# Patient Record
Sex: FEMALE | Race: WHITE | NOT HISPANIC OR LATINO | URBAN - METROPOLITAN AREA
[De-identification: names, ages, dates, MRNs, and addresses within clinical notes are randomized per-mention and may not be internally consistent; named-entity substitution may affect disease eponyms.]

---

## 2019-03-11 ENCOUNTER — TELEPHONE (OUTPATIENT)
Dept: OBSTETRICS AND GYNECOLOGY | Age: 39
End: 2019-03-11

## 2019-03-11 NOTE — TELEPHONE ENCOUNTER
NEW PATIENT: was a pt of Dr Dominguez moved in 2011, now is back wanting to re-estab gyn care, has [employer]. Pt aware Dr Dominguez will not see til Wednesday.

## 2019-05-22 ENCOUNTER — OFFICE VISIT (OUTPATIENT)
Dept: OBSTETRICS AND GYNECOLOGY | Age: 39
End: 2019-05-22

## 2019-05-22 VITALS
DIASTOLIC BLOOD PRESSURE: 72 MMHG | HEIGHT: 69 IN | WEIGHT: 185.2 LBS | SYSTOLIC BLOOD PRESSURE: 122 MMHG | BODY MASS INDEX: 27.43 KG/M2

## 2019-05-22 DIAGNOSIS — Z01.419 WELL WOMAN EXAM WITH ROUTINE GYNECOLOGICAL EXAM: Primary | ICD-10-CM

## 2019-05-22 DIAGNOSIS — Z12.4 SCREENING FOR CERVICAL CANCER: ICD-10-CM

## 2019-05-22 DIAGNOSIS — Z11.51 ENCOUNTER FOR SCREENING FOR HUMAN PAPILLOMAVIRUS (HPV): ICD-10-CM

## 2019-05-22 DIAGNOSIS — Z30.011 ENCOUNTER FOR INITIAL PRESCRIPTION OF CONTRACEPTIVE PILLS: ICD-10-CM

## 2019-05-22 DIAGNOSIS — K42.9 UMBILICAL HERNIA WITHOUT OBSTRUCTION AND WITHOUT GANGRENE: ICD-10-CM

## 2019-05-22 PROCEDURE — 99385 PREV VISIT NEW AGE 18-39: CPT | Performed by: OBSTETRICS & GYNECOLOGY

## 2019-05-22 RX ORDER — NORETHINDRONE ACETATE AND ETHINYL ESTRADIOL AND FERROUS FUMARATE 1MG-20(24)
1 KIT ORAL DAILY
Qty: 28 TABLET | Refills: 12 | Status: SHIPPED | OUTPATIENT
Start: 2019-05-22 | End: 2020-05-21

## 2019-05-22 RX ORDER — LEVOTHYROXINE SODIUM 0.15 MG/1
150 TABLET ORAL DAILY
Refills: 1 | COMMUNITY
Start: 2019-05-09 | End: 2020-06-17

## 2019-05-22 RX ORDER — CHOLECALCIFEROL (VITAMIN D3) 1250 MCG
CAPSULE ORAL
Refills: 1 | COMMUNITY
Start: 2019-04-14

## 2019-05-22 NOTE — PROGRESS NOTES
Chief complaint:    Subjective   History of Present Illness    Misty Juarez is a 39 y.o.  who presents for annual exam/ re-establish care. Delivered first 2 children with our group then moved to [place] several years ago.  Her menses are regular every 28-30 days, lasting 4-7 days, menorrhagia for first 2 days, no dysmenorrhea. Uses condoms for contraception. Wants to restart ocps.   Soc Hx- , pt and  are both engineers, she works for [employer] ([employer])    Obstetric History:  OB History      Para Term  AB Living    6 4 4   2 4    SAB TAB Ectopic Molar Multiple Live Births    2         4         Menstrual History:     Patient's last menstrual period was 2019 (exact date).         Current contraception: condoms  History of abnormal Pap smear: no  Received Gardasil immunization: no  Perform regular self breast exam: yes -    Family history of uterine or ovarian cancer: no  Family History of colon cancer: no  Family history of breast cancer: no    Mammogram: not indicated.  Colonoscopy: not indicated.  DEXA: not indicated.    Exercise: moderately active  Calcium/Vitamin D: adequate intake    The following portions of the patient's history were reviewed and updated as appropriate: allergies, current medications, past family history, past medical history, past social history, past surgical history and problem list.    Review of Systems   Constitutional: Negative for activity change, fatigue, fever and unexpected weight change.   Respiratory: Negative for chest tightness and shortness of breath.    Cardiovascular: Negative for chest pain, palpitations and leg swelling.   Gastrointestinal: Negative for abdominal distention, abdominal pain, blood in stool, constipation, diarrhea, nausea and vomiting.   Endocrine: Negative for cold intolerance, heat intolerance, polydipsia, polyphagia and polyuria.        Breast tenderness before menses   Genitourinary: Positive for menstrual  "problem.        Menorrhagia  Mid cycle pelvic pain   Musculoskeletal: Negative for arthralgias and back pain.   Skin: Negative for color change.   Neurological: Negative for weakness and headaches.   Hematological: Does not bruise/bleed easily.   Psychiatric/Behavioral: Negative for confusion.       Pertinent items are noted in HPI.     Objective   Physical Exam    /72   Ht 175.3 cm (69\")   Wt 84 kg (185 lb 3.2 oz)   LMP 05/08/2019 (Exact Date)   Breastfeeding? No   BMI 27.35 kg/m²     General:   alert, appears stated age and cooperative   Neck: no asymmetry, masses, or scars   Heart: regular rate and rhythm, S1, S2 normal, no murmur, click, rub or gallop   Lungs: clear to auscultation bilaterally   Abdomen: soft, non-tender, without masses or organomegaly, umbilical hernia noted   Breast: inspection negative, no nipple discharge or bleeding, no masses or nodularity palpable   Vulva: normal, Bartholin's, Urethra, [place]'s normal   Vagina: normal mucosa   Cervix: multiparous appearance, no bleeding following Pap and no cervical motion tenderness   Uterus: normal size, anteverted, mobile, non-tender, normal shape and consistency   Adnexa: normal adnexa and no mass, fullness, tenderness   Rectal: not indicated     Assessment/Plan   Misty was seen today for gynecologic exam.    Diagnoses and all orders for this visit:    Well woman exam with routine gynecological exam  -     PapIG, HPV, Rfx 16 / 18    Encounter for screening for human papillomavirus (HPV)    Encounter for initial prescription of contraceptive pills  -     PapIG, HPV, Rfx 16 / 18  -     norethindrone-ethinyl estradiol-ferrous fumarate (LOESTIN 24 FE) 1-20 MG-MCG(24) per tablet; Take 1 tablet by mouth Daily.    Screening for cervical cancer    Umbilical hernia without obstruction and without gangrene  -     Ambulatory Referral to General Surgery    discussed options for contraception, desires ocps, risks reviewed (DVT,PE,stroke) pt declines IUD " and Nexplanon at this time.  Recommend mammogram age 41.    Breast self exam technique reviewed and patient encouraged to perform self-exam monthly.  Discussed healthy lifestyle modifications.  Pap smear sent

## 2019-05-24 LAB
CYTOLOGIST CVX/VAG CYTO: NORMAL
CYTOLOGY CVX/VAG DOC CYTO: NORMAL
CYTOLOGY CVX/VAG DOC THIN PREP: NORMAL
DX ICD CODE: NORMAL
HIV 1 & 2 AB SER-IMP: NORMAL
HPV I/H RISK 1 DNA CVX QL PROBE+SIG AMP: NEGATIVE
OTHER STN SPEC: NORMAL
STAT OF ADQ CVX/VAG CYTO-IMP: NORMAL

## 2019-05-28 ENCOUNTER — TELEPHONE (OUTPATIENT)
Dept: OBSTETRICS AND GYNECOLOGY | Age: 39
End: 2019-05-28

## 2019-06-25 ENCOUNTER — OFFICE VISIT (OUTPATIENT)
Dept: SURGERY | Facility: CLINIC | Age: 39
End: 2019-06-25

## 2019-06-25 VITALS
DIASTOLIC BLOOD PRESSURE: 80 MMHG | SYSTOLIC BLOOD PRESSURE: 120 MMHG | BODY MASS INDEX: 28.2 KG/M2 | OXYGEN SATURATION: 99 % | HEART RATE: 85 BPM | WEIGHT: 190.4 LBS | HEIGHT: 69 IN

## 2019-06-25 DIAGNOSIS — K42.9 UMBILICAL HERNIA WITHOUT OBSTRUCTION AND WITHOUT GANGRENE: Primary | ICD-10-CM

## 2019-06-25 PROCEDURE — 99203 OFFICE O/P NEW LOW 30 MIN: CPT | Performed by: SURGERY

## 2019-06-25 NOTE — PROGRESS NOTES
Chief Complaint   Patient presents with   • Hernia     umb        Patient is a 39 y.o. female referred by Schuyler Norton MD for evaluation of periumbilical pain.  Patient reports she has a bulge in her umbilicus that is increased in size over the last 2 to 3 years.  Patient reports when she stands for long periods of time it aches or is active.  Patient denies nausea, vomiting, fever or chills.  Patient has not had any previous abdominal surgeries.  Patient reports that we will go in and be flat when she is lying down.    Past Medical History:   Diagnosis Date   • Gestational hypertension    • Umbilical hernia      Past Surgical History:   Procedure Laterality Date   • DILATATION AND CURETTAGE       Family History   Problem Relation Age of Onset   • Breast cancer Neg Hx    • Ovarian cancer Neg Hx    • Uterine cancer Neg Hx    • Colon cancer Neg Hx    • Deep vein thrombosis Neg Hx    • Pulmonary embolism Neg Hx      Social History     Tobacco Use   • Smoking status: Never Smoker   • Smokeless tobacco: Never Used   Substance Use Topics   • Alcohol use: Yes     Comment: rare use   • Drug use: No     No Known Allergies    Current Outpatient Medications:   •  Cholecalciferol (VITAMIN D3) 59833 units capsule, TK 1 C PO WEEKLY, Disp: , Rfl: 1  •  levothyroxine (SYNTHROID, LEVOTHROID) 150 MCG tablet, Take 150 mcg by mouth Daily., Disp: , Rfl: 1  •  norethindrone-ethinyl estradiol-ferrous fumarate (LOESTIN 24 FE) 1-20 MG-MCG(24) per tablet, Take 1 tablet by mouth Daily., Disp: 28 tablet, Rfl: 12    Review of Systems   Gastrointestinal: Positive for abdominal distention.   Allergic/Immunologic: Positive for environmental allergies.   All other systems reviewed and are negative.      There were no vitals filed for this visit.    Physical Exam  General/physcological:   Alert and oriented x3, in no acute distress  HEENT: Normal cephalic, atraumatic, PERRLA, EOMI, sclera anicteric, moist mucous membranes, neck is supple, no  JVD, no carotid bruits, no thyromegaly no adenopathy  Respiratory: CTA and percussion  CVA: RRR, normal S1-S2, no murmurs, no gallops or rubs  GI: Positive BS, soft, nondistended, nontender, no rebound, no guarding, reducible umbilical hernia, no organomegaly and no masses  Musculoskeletal: Full range of motion, no clubbing, no cyanosis or edema  Neurovascular: Grossly intact  Debilities: none  Emotional behavior: appropriate     Patient does not use tobacco products currently.     Assessment:  Reducible umbilical hernia symptomatic  Plan:  I have recommended that the patient undergo an open umbilical hernia repair with mesh.  I have discussed this procedure in detail with the patient and given her patient teaching pamphlet.  I have discussed the risks, benefits and alternatives.  I have discussed the risk of anesthesia, bleeding, infection, DVTs, bowel injuries and recurrence.  Patient understands these risks, benefits and alternatives and wishes to proceed.  I have her scheduled at her earliest convenience.    Kelle Bruce MD  General, Minimally Invasive and Endoscopic Surgery  [facility] [place] [id number] [facility] [address] [place] [address]   [address]    [address]  [place], [place] [id number] [place] [address]    P: [phone]  F: [phone]    Cc:  Schuyler Norton MD

## 2019-07-26 ENCOUNTER — OUTSIDE FACILITY SERVICE (OUTPATIENT)
Dept: SURGERY | Facility: CLINIC | Age: 39
End: 2019-07-26

## 2019-07-26 PROCEDURE — 49587 PR REPAIR UMBILICAL HERN,5+Y/O,STRANG: CPT | Performed by: SURGERY

## 2019-08-06 ENCOUNTER — OFFICE VISIT (OUTPATIENT)
Dept: SURGERY | Facility: CLINIC | Age: 39
End: 2019-08-06

## 2019-08-06 VITALS
OXYGEN SATURATION: 98 % | HEART RATE: 119 BPM | DIASTOLIC BLOOD PRESSURE: 84 MMHG | HEIGHT: 69 IN | WEIGHT: 193.6 LBS | SYSTOLIC BLOOD PRESSURE: 120 MMHG | BODY MASS INDEX: 28.68 KG/M2

## 2019-08-06 DIAGNOSIS — Z09 FOLLOW UP: Primary | ICD-10-CM

## 2019-08-06 PROCEDURE — 99024 POSTOP FOLLOW-UP VISIT: CPT | Performed by: SURGERY

## 2019-08-06 NOTE — PROGRESS NOTES
"Chief complaint:  Post-op  Follow up    History of Present Illness    This is Misty Juarez 39 y.o. status post open umbilical hernia repair with mesh and is doing very well.  Patient denies fever, chills, nausea or vomiting.  Patient's pain is well-controlled.      The following portions of the patient's history were reviewed and updated as appropriate: allergies, current medications, past family history, past medical history, past social history, past surgical history and problem list.    Vitals:    08/06/19 0910   BP: 120/84   Pulse: 119   SpO2: 98%   Weight: 87.8 kg (193 lb 9.6 oz)   Height: 175.3 cm (69\")       Physical Exam  Gen.: Patient is alert and oriented ×3, no acute distress  HEENT: Normal cephalic, atraumatic, moist mucous membranes, anicteric  Incision is well-healed without evidence of infection, herniation or seroma.    Assessment/plan:    This is Misty Juarez 39 y.o. status post open umbilical hernia repair with mesh and is doing very well.  I have instructed the patient not lift greater than 10 pounds for total of 6 weeks from the time of surgery. I have instructed the patient follow-up as needed.    Kelle Bruce MD  General, Minimally Invasive and Endoscopic Surgery  [facility]    [address] [facility] [address] [place] [address]    [address]  [place], [place] [id number] [place] [address]    P: [phone]  F: [phone]    Cc:  Schuyler Norton MD  "

## 2020-06-17 ENCOUNTER — OFFICE VISIT (OUTPATIENT)
Dept: OBSTETRICS AND GYNECOLOGY | Age: 40
End: 2020-06-17

## 2020-06-17 VITALS
SYSTOLIC BLOOD PRESSURE: 110 MMHG | HEIGHT: 69 IN | BODY MASS INDEX: 30.51 KG/M2 | WEIGHT: 206 LBS | DIASTOLIC BLOOD PRESSURE: 72 MMHG

## 2020-06-17 DIAGNOSIS — Z01.419 WELL WOMAN EXAM WITH ROUTINE GYNECOLOGICAL EXAM: Primary | ICD-10-CM

## 2020-06-17 DIAGNOSIS — Z30.41 ENCOUNTER FOR SURVEILLANCE OF CONTRACEPTIVE PILLS: ICD-10-CM

## 2020-06-17 PROCEDURE — 99395 PREV VISIT EST AGE 18-39: CPT | Performed by: OBSTETRICS & GYNECOLOGY

## 2020-06-17 RX ORDER — LEVOTHYROXINE SODIUM 137 UG/1
137 TABLET ORAL DAILY
COMMUNITY
Start: 2020-05-19

## 2020-06-17 RX ORDER — ACETAMINOPHEN AND CODEINE PHOSPHATE 120; 12 MG/5ML; MG/5ML
1 SOLUTION ORAL DAILY
Qty: 84 TABLET | Refills: 3 | Status: SHIPPED | OUTPATIENT
Start: 2020-06-17 | End: 2021-06-17

## 2020-06-17 RX ORDER — LISINOPRIL 10 MG/1
5 TABLET ORAL DAILY
COMMUNITY
Start: 2020-05-29

## 2020-06-17 RX ORDER — CYCLOBENZAPRINE HCL 10 MG
TABLET ORAL
COMMUNITY
Start: 2020-03-23

## 2020-06-17 NOTE — PROGRESS NOTES
"Chief complaint: annual    Subjective   History of Present Illness    Misty Juarez is a 40 y.o.  who presents for annual exam. Doing well. Recently started lisinopril for HTN. Was taking Loestrin 24 for contraception but ran out 3 mo ago. Desires to restart ocps. Declines IUD or Nexplanon (anything inserted). Currently using condoms  Her menses are regular every 28-30 days, lasting 3-4 days.   2019 pap normal, HPV-  Had umbilical hernia repair     Obstetric History:  OB History        6    Para   4    Term   4            AB   2    Living   4       SAB   2    TAB        Ectopic        Molar        Multiple        Live Births   4               Menstrual History:     Patient's last menstrual period was 2020.         Current contraception: condoms  History of abnormal Pap smear: no  Received Gardasil immunization: no  Perform regular self breast exam: mp  Family history of uterine or ovarian cancer: no  Family History of colon cancer:  no  Family history of breast cancer: no    Mammogram: ordered.  Colonoscopy: not indicated.  DEXA: not indicated.    Exercise: moderately active  Calcium/Vitamin D: adequate intake    The following portions of the patient's history were reviewed and updated as appropriate: allergies, current medications, past family history, past medical history, past social history, past surgical history and problem list.    Review of Systems    Pertinent items are noted in HPI.     Objective   Physical Exam    /72   Ht 175.3 cm (69\")   Wt 93.4 kg (206 lb)   LMP 2020   Breastfeeding No   BMI 30.42 kg/m²     General:   alert, appears stated age and cooperative   Neck: no asymmetry, masses, or scars   Heart: regular rate and rhythm, S1, S2 normal, no murmur, click, rub or gallop   Lungs: clear to auscultation bilaterally   Abdomen: soft, non-tender, without masses or organomegaly   Breast: inspection negative, no nipple discharge or bleeding, no masses or " nodularity palpable   Vulva: Bartholin's, Urethra, [place]'s normal   Vagina: normal mucosa   Cervix: multiparous appearance, no cervical motion tenderness and no lesions   Uterus: normal size, mobile, non-tender, normal shape and consistency   Adnexa: normal adnexa and no mass, fullness, tenderness   Rectal: not indicated     Assessment/Plan   Misty was seen today for gynecologic exam.    Diagnoses and all orders for this visit:    Well woman exam with routine gynecological exam    Encounter for surveillance of contraceptive pills  -     norethindrone (MICRONOR) 0.35 MG tablet; Take 1 tablet by mouth Daily.    pt desires to restart ocps. Will change to progesterone only pills due to chronic HTN requiring meds  May need to use condoms also as micronor is slightly less effective for contraception.    Breast self exam technique reviewed and patient encouraged to perform self-exam monthly.  Discussed healthy lifestyle modifications.  Pap smear up to date    Will start yearly mmg next year

## 2021-06-29 ENCOUNTER — PROCEDURE VISIT (OUTPATIENT)
Dept: OBSTETRICS AND GYNECOLOGY | Age: 41
End: 2021-06-29

## 2021-06-29 ENCOUNTER — APPOINTMENT (OUTPATIENT)
Dept: WOMENS IMAGING | Facility: HOSPITAL | Age: 41
End: 2021-06-29

## 2021-06-29 ENCOUNTER — OFFICE VISIT (OUTPATIENT)
Dept: OBSTETRICS AND GYNECOLOGY | Age: 41
End: 2021-06-29

## 2021-06-29 VITALS
DIASTOLIC BLOOD PRESSURE: 68 MMHG | SYSTOLIC BLOOD PRESSURE: 116 MMHG | WEIGHT: 189.6 LBS | HEIGHT: 69 IN | BODY MASS INDEX: 28.08 KG/M2

## 2021-06-29 DIAGNOSIS — Z12.31 VISIT FOR SCREENING MAMMOGRAM: Primary | ICD-10-CM

## 2021-06-29 DIAGNOSIS — Z01.419 WELL WOMAN EXAM WITH ROUTINE GYNECOLOGICAL EXAM: Primary | ICD-10-CM

## 2021-06-29 PROCEDURE — 77063 BREAST TOMOSYNTHESIS BI: CPT | Performed by: RADIOLOGY

## 2021-06-29 PROCEDURE — 77067 SCR MAMMO BI INCL CAD: CPT | Performed by: RADIOLOGY

## 2021-06-29 PROCEDURE — 77063 BREAST TOMOSYNTHESIS BI: CPT | Performed by: OBSTETRICS & GYNECOLOGY

## 2021-06-29 PROCEDURE — 77067 SCR MAMMO BI INCL CAD: CPT | Performed by: OBSTETRICS & GYNECOLOGY

## 2021-06-29 PROCEDURE — 99396 PREV VISIT EST AGE 40-64: CPT | Performed by: OBSTETRICS & GYNECOLOGY

## 2021-06-29 NOTE — PROGRESS NOTES
"Chief complaint: annual    Subjective   History of Present Illness    Misty Juarez is a 41 y.o.  who presents for annual exam. No gyn c/o. Uses condoms for contraception. micronor prescribed last year for contraception but she never started it. Had to dc combination ocps due to chronic HTN requiring antihypertensives. She declines IUD or progesterone only pills.  Her menses are q mo, last 4 days, normal flow  MMG today  2019 pap normal, HPV-  Soc hx- , works as an     Obstetric History:  OB History        6    Para   4    Term   4            AB   2    Living   4       SAB   2    TAB        Ectopic        Molar        Multiple        Live Births   4               Menstrual History:     Patient's last menstrual period was 2021 (exact date).         Current contraception: condoms  History of abnormal Pap smear: no  Received Gardasil immunization: no  Perform regular self breast exam: yes -    Family history of uterine or ovarian cancer: no  Family History of colon cancer: no  Family history of breast cancer: no    Mammogram: done today.  Colonoscopy: not indicated.  DEXA: not indicated.    Exercise: moderately active  Calcium/Vitamin D: adequate intake    The following portions of the patient's history were reviewed and updated as appropriate: allergies, current medications, past family history, past medical history, past social history, past surgical history and problem list.    Review of Systems   Constitutional: Negative.    Genitourinary: Negative.        Pertinent items are noted in HPI.     Objective   Physical Exam    /68   Ht 175.3 cm (69\")   Wt 86 kg (189 lb 9.6 oz)   LMP 2021 (Exact Date)   Breastfeeding No   BMI 28.00 kg/m²     General:   alert, appears stated age and cooperative   Neck: no asymmetry, masses, or scars   Heart: regular rate and rhythm, S1, S2 normal, no murmur, click, rub or gallop   Lungs: clear to auscultation bilaterally "   Abdomen: soft, non-tender, without masses or organomegaly   Breast: inspection negative, no nipple discharge or bleeding, no masses or nodularity palpable   Vulva: normal, Bartholin's, Urethra, [place]'s normal   Vagina: normal mucosa   Cervix: multiparous appearance, no cervical motion tenderness and no lesions   Uterus: normal size, mobile, non-tender, normal shape and consistency   Adnexa: normal adnexa and no mass, fullness, tenderness   Rectal: not indicated and external hemorrhoids noted       Assessment/Plan   Diagnoses and all orders for this visit:    1. Well woman exam with routine gynecological exam (Primary)    pap due next year, plan q 3 yrs  Pt states hemorrhoids don't bother her, declines referral for consult    Breast self exam technique reviewed and patient encouraged to perform self-exam monthly.  Discussed healthy lifestyle modifications.  Pap smear up to date    She will see Dr Bloom next year for annual exam and gyn care  Notified that I am leaving practice

## 2021-11-04 ENCOUNTER — TRANSCRIBE ORDERS (OUTPATIENT)
Dept: ADMINISTRATIVE | Facility: HOSPITAL | Age: 41
End: 2021-11-04

## 2021-11-04 DIAGNOSIS — U07.1 CLINICAL DIAGNOSIS OF SEVERE ACUTE RESPIRATORY SYNDROME CORONAVIRUS 2 (SARS-COV-2) DISEASE: Primary | ICD-10-CM

## 2021-11-05 ENCOUNTER — HOSPITAL ENCOUNTER (OUTPATIENT)
Dept: INFUSION THERAPY | Facility: HOSPITAL | Age: 41
Discharge: HOME | End: 2021-11-05
Admitting: INTERNAL MEDICINE

## 2021-11-05 VITALS
HEIGHT: 69 IN | RESPIRATION RATE: 20 BRPM | HEART RATE: 102 BPM | BODY MASS INDEX: 27.4 KG/M2 | OXYGEN SATURATION: 98 % | DIASTOLIC BLOOD PRESSURE: 78 MMHG | TEMPERATURE: 97.1 F | WEIGHT: 185 LBS | SYSTOLIC BLOOD PRESSURE: 132 MMHG

## 2021-11-05 DIAGNOSIS — U07.1 COVID: Primary | ICD-10-CM

## 2021-11-05 PROCEDURE — 96365 THER/PROPH/DIAG IV INF INIT: CPT

## 2021-11-05 PROCEDURE — 25010000002 INJECTION, CASIRIVIMAB AND IMDEVIMAB, 1200 MG: Performed by: INTERNAL MEDICINE

## 2021-11-05 PROCEDURE — M0243 CASIRIVI AND IMDEVI INFUSION: HCPCS | Performed by: INTERNAL MEDICINE

## 2021-11-05 RX ORDER — DIPHENHYDRAMINE HCL 25 MG
50 CAPSULE ORAL ONCE AS NEEDED
Status: DISCONTINUED | OUTPATIENT
Start: 2021-11-05 | End: 2021-11-07 | Stop reason: HOSPADM

## 2021-11-05 RX ORDER — DIPHENHYDRAMINE HCL 25 MG
50 CAPSULE ORAL ONCE AS NEEDED
Status: CANCELLED | OUTPATIENT
Start: 2021-11-05

## 2021-11-05 RX ORDER — DIPHENHYDRAMINE HYDROCHLORIDE 50 MG/ML
50 INJECTION INTRAMUSCULAR; INTRAVENOUS ONCE AS NEEDED
Status: CANCELLED | OUTPATIENT
Start: 2021-11-05

## 2021-11-05 RX ORDER — SODIUM CHLORIDE 9 MG/ML
30 INJECTION, SOLUTION INTRAVENOUS ONCE
Status: CANCELLED | OUTPATIENT
Start: 2021-11-05

## 2021-11-05 RX ORDER — DIPHENHYDRAMINE HCL 50 MG
50 CAPSULE ORAL ONCE AS NEEDED
Status: CANCELLED | OUTPATIENT
Start: 2021-11-05

## 2021-11-05 RX ORDER — EPINEPHRINE 1 MG/ML
0.3 INJECTION, SOLUTION, CONCENTRATE INTRAVENOUS AS NEEDED
Status: CANCELLED | OUTPATIENT
Start: 2021-11-05

## 2021-11-05 RX ORDER — SODIUM CHLORIDE 9 MG/ML
30 INJECTION, SOLUTION INTRAVENOUS ONCE
Status: COMPLETED | OUTPATIENT
Start: 2021-11-05 | End: 2021-11-05

## 2021-11-05 RX ORDER — METHYLPREDNISOLONE SODIUM SUCCINATE 125 MG/2ML
125 INJECTION, POWDER, LYOPHILIZED, FOR SOLUTION INTRAMUSCULAR; INTRAVENOUS AS NEEDED
Status: CANCELLED | OUTPATIENT
Start: 2021-11-05

## 2021-11-05 RX ORDER — METHYLPREDNISOLONE SODIUM SUCCINATE 125 MG/2ML
125 INJECTION, POWDER, LYOPHILIZED, FOR SOLUTION INTRAMUSCULAR; INTRAVENOUS AS NEEDED
Status: DISCONTINUED | OUTPATIENT
Start: 2021-11-05 | End: 2021-11-07 | Stop reason: HOSPADM

## 2021-11-05 RX ORDER — DIPHENHYDRAMINE HYDROCHLORIDE 50 MG/ML
50 INJECTION INTRAMUSCULAR; INTRAVENOUS ONCE AS NEEDED
Status: DISCONTINUED | OUTPATIENT
Start: 2021-11-05 | End: 2021-11-07 | Stop reason: HOSPADM

## 2021-11-05 RX ADMIN — IMDEVIMAB: 1332 INJECTION, SOLUTION, CONCENTRATE INTRAVENOUS at 14:51

## 2021-11-05 RX ADMIN — SODIUM CHLORIDE 30 ML: 9 INJECTION, SOLUTION INTRAVENOUS at 14:50

## 2022-10-20 ENCOUNTER — APPOINTMENT (OUTPATIENT)
Dept: WOMENS IMAGING | Facility: HOSPITAL | Age: 42
End: 2022-10-20

## 2022-10-20 ENCOUNTER — PROCEDURE VISIT (OUTPATIENT)
Dept: OBSTETRICS AND GYNECOLOGY | Age: 42
End: 2022-10-20

## 2022-10-20 ENCOUNTER — OFFICE VISIT (OUTPATIENT)
Dept: OBSTETRICS AND GYNECOLOGY | Age: 42
End: 2022-10-20

## 2022-10-20 VITALS
HEIGHT: 69 IN | BODY MASS INDEX: 31.84 KG/M2 | WEIGHT: 215 LBS | SYSTOLIC BLOOD PRESSURE: 128 MMHG | DIASTOLIC BLOOD PRESSURE: 76 MMHG

## 2022-10-20 DIAGNOSIS — Z12.4 SCREENING FOR CERVICAL CANCER: ICD-10-CM

## 2022-10-20 DIAGNOSIS — Z11.51 SCREENING FOR HPV (HUMAN PAPILLOMAVIRUS): ICD-10-CM

## 2022-10-20 DIAGNOSIS — Z12.31 VISIT FOR SCREENING MAMMOGRAM: Primary | ICD-10-CM

## 2022-10-20 DIAGNOSIS — Z01.419 ENCOUNTER FOR GYNECOLOGICAL EXAMINATION WITHOUT ABNORMAL FINDING: Primary | ICD-10-CM

## 2022-10-20 PROCEDURE — 99396 PREV VISIT EST AGE 40-64: CPT | Performed by: OBSTETRICS & GYNECOLOGY

## 2022-10-20 PROCEDURE — 77063 BREAST TOMOSYNTHESIS BI: CPT | Performed by: RADIOLOGY

## 2022-10-20 PROCEDURE — 77067 SCR MAMMO BI INCL CAD: CPT | Performed by: RADIOLOGY

## 2022-10-20 PROCEDURE — 77067 SCR MAMMO BI INCL CAD: CPT | Performed by: OBSTETRICS & GYNECOLOGY

## 2022-10-20 PROCEDURE — 77063 BREAST TOMOSYNTHESIS BI: CPT | Performed by: OBSTETRICS & GYNECOLOGY

## 2022-10-20 RX ORDER — GABAPENTIN 100 MG/1
CAPSULE ORAL
COMMUNITY
Start: 2022-08-14

## 2022-10-20 RX ORDER — LEVOTHYROXINE SODIUM 112 MCG
TABLET ORAL
COMMUNITY
Start: 2022-09-24 | End: 2022-10-20 | Stop reason: SDUPTHER

## 2022-10-20 RX ORDER — TRIAMCINOLONE ACETONIDE 1 MG/G
CREAM TOPICAL
COMMUNITY
Start: 2022-09-14

## 2022-10-20 RX ORDER — LISINOPRIL 5 MG/1
TABLET ORAL
COMMUNITY
Start: 2022-09-24

## 2022-10-20 NOTE — PROGRESS NOTES
Routine Annual Visit    10/20/2022    Patient: Misty Juarez          MR#:[id number]      Chief Complaint   Patient presents with   • Gynecologic Exam     Mammo Before appointment, Last Pap 19 (-), Last Mammo 21, No Concerns at this time       History of Present Illness    42 y.o. female  who presents for annual exam.   Regular menses , no issues  mammo today   Pap today  Kids well  occ pain in right side          No LMP recorded (exact date).  Obstetric History:  OB History        6    Para   4    Term   4            AB   2    Living   4       SAB   2    IAB        Ectopic        Molar        Multiple        Live Births   4               Menstrual History:     No LMP recorded (exact date).       Sexual History:       ________________________________________  Patient Active Problem List   Diagnosis   • COVID       Past Medical History:   Diagnosis Date   • Disease of thyroid gland    • Gestational hypertension     chronic HTN, startd meds    • Graves' disease     treated with radioactive iodine 20 years ago   • Umbilical hernia        Past Surgical History:   Procedure Laterality Date   • DILATATION AND CURETTAGE     • UMBILICAL HERNIA REPAIR  2019   • WISDOM TOOTH EXTRACTION Bilateral     all four       Social History     Tobacco Use   Smoking Status Never   Smokeless Tobacco Never       has a current medication list which includes the following prescription(s): vitamin d3, gabapentin, levothyroxine, lisinopril, mupirocin, cyclobenzaprine, ivermectin, lisinopril, and triamcinolone.  ________________________________________    Current contraception: condoms  History of abnormal Pap smear: no  Family history of Breast cancer: no  Family history of uterine or ovarian cancer: no  Family History of colon cancer/colon polyps: no  History of abnormal mammogram: no      The following portions of the patient's history were reviewed and updated as appropriate: allergies, current  "medications, past family history, past medical history, past social history, past surgical history and problem list.    Review of Systems    Pertinent items are noted in HPI.     Objective   Physical Exam    /76   Ht 175.3 cm (69\")   Wt 97.5 kg (215 lb)   LMP  (Exact Date)   BMI 31.75 kg/m²    BP Readings from Last 3 Encounters:   10/20/22 128/76   11/05/21 132/78   06/29/21 116/68      Wt Readings from Last 3 Encounters:   10/20/22 97.5 kg (215 lb)   11/05/21 83.9 kg (185 lb)   06/29/21 86 kg (189 lb 9.6 oz)      BMI: Estimated body mass index is 31.75 kg/m² as calculated from the following:    Height as of this encounter: 175.3 cm (69\").    Weight as of this encounter: 97.5 kg (215 lb).      General:   alert, appears stated age and cooperative   Abdomen: soft, non-tender, without masses or organomegaly   Breast: inspection negative, no nipple discharge or bleeding, no masses or nodularity palpable   Vulva: normal   Vagina: normal mucosa   Cervix: no cervical motion tenderness and no lesions   Uterus: normal size, mobile and non-tender   Adnexa: no mass, fullness, tenderness     Assessment:    1. Normal annual exam   Assessment     ICD-10-CM ICD-9-CM   1. Encounter for gynecological examination without abnormal finding  Z01.419 V72.31   2. Screening for cervical cancer  Z12.4 V76.2   3. Screening for HPV (human papillomavirus)  Z11.51 V73.81     Plan:    Plan     [x]  Mammogram request made  [x]  PAP done  []  Labs:   []  GC/Chl/TV  []  DEXA scan   []  Referral for colonoscopy:       Diagnoses and all orders for this visit:    1. Encounter for gynecological examination without abnormal finding (Primary)    2. Screening for cervical cancer  -     IGP, Apt HPV,rfx 16 / 18,45    3. Screening for HPV (human papillomavirus)  -     IGP, Apt HPV,rfx 16 / 18,45            Counseling:  --Nutrition: Stressed importance of moderation and caloric balance, stressed fresh fruit and vegetables  --Exercise: Stressed the " importance of regular exercise. 3-5 times weekly   - Discussed screening mammogram recommendations.   --Discussed benefits of screening colonoscopy- age 45 unless FH  --Discussed pap smear screening recommendations

## 2022-10-26 LAB
CYTOLOGIST CVX/VAG CYTO: NORMAL
CYTOLOGY CVX/VAG DOC CYTO: NORMAL
CYTOLOGY CVX/VAG DOC THIN PREP: NORMAL
DX ICD CODE: NORMAL
HIV 1 & 2 AB SER-IMP: NORMAL
HPV I/H RISK 4 DNA CVX QL PROBE+SIG AMP: NEGATIVE
OTHER STN SPEC: NORMAL
STAT OF ADQ CVX/VAG CYTO-IMP: NORMAL

## 2023-08-02 DIAGNOSIS — Z12.31 SCREENING MAMMOGRAM FOR BREAST CANCER: Primary | ICD-10-CM
